# Patient Record
Sex: MALE | URBAN - NONMETROPOLITAN AREA
[De-identification: names, ages, dates, MRNs, and addresses within clinical notes are randomized per-mention and may not be internally consistent; named-entity substitution may affect disease eponyms.]

---

## 2024-03-29 ENCOUNTER — TELEPHONE (OUTPATIENT)
Age: 69
End: 2024-03-29

## 2024-03-29 NOTE — TELEPHONE ENCOUNTER
Message sent to [facility]    [10:29 AM] Maddy Howard ([facility])     New Consult   Elbert Thompson 11/29/1961   Denise @ [facility] [phone] called in consult. Dr Michel Goss is consulting for wound culture positive for ESBL Klebsiella. The patient is in room 581.

## 2024-10-25 NOTE — PROGRESS NOTES
Subjective    Mr. Victor is 69 y.o. male    Chief Complaint: Kidney stone    History of Present Illness  Patient presents as a new patient to [facility] urology he was referred due to history of kidney stones.  Recently passed a 6 x 4 mm left proximal ureteral stone it was seen on CT scan he did bring the disc with him today.  He was not having any pain he developed gross hematuria.  He is on blood thinners he is on aspirin and Xarelto.  CT showed no other kidney stones.  Patient had episode approximately 30 years ago.  Also has history of prostate cancer he had a prostatectomy 2005 at [facility].  Has had nondetectable PSAs.  Patient is doing fine now he brought his stone in to his PCP and it showed findings consistent with calcium stones.  Patient had KUB today I see nothing on the KUB there is a calcification described on the KUB in the left upper quadrant however on CT there is no vascular calcification or any kidney stone to explain this most likely is artifact.      Review of Systems   Constitutional: Negative.    Gastrointestinal: Negative.    Genitourinary: Negative.          Current Outpatient Medications:     ALPRAZolam (XANAX) 0.5 MG tablet, Take 1 tablet by mouth 2 (Two) Times a Day As Needed for Anxiety., Disp: , Rfl:     amLODIPine (NORVASC) 5 MG tablet, Take 1 tablet by mouth Daily., Disp: , Rfl:     Ascorbic Acid (VITAMIN C PO), Take  by mouth., Disp: , Rfl:     aspirin 81 MG EC tablet, Take 1 tablet by mouth Daily., Disp: , Rfl:     Calcium Citrate-Vitamin D3 (CITRACAL) 315-6.25 MG-MCG tablet tablet, Take  by mouth Daily., Disp: , Rfl:     Coenzyme Q10 10 MG capsule, Take 10 mg by mouth Daily., Disp: , Rfl:     Cyanocobalamin 1000 MCG capsule, Take  by mouth., Disp: , Rfl:     ezetimibe-simvastatin (VYTORIN) 10-20 MG per tablet, Take 1 tablet by mouth Daily., Disp: , Rfl:     fluticasone (FLONASE) 50 MCG/ACT nasal spray, 2 SPRAYS EACH NOSTRIL EACH MORNING, Disp: , Rfl:     levocetirizine (XYZAL) 5 MG  tablet, Take 1 tablet by mouth Daily., Disp: , Rfl:     levothyroxine (SYNTHROID, LEVOTHROID) 88 MCG tablet, Take 1 tablet by mouth Daily., Disp: , Rfl:     losartan-hydrochlorothiazide (HYZAAR) 100-25 MG per tablet, Take 1 tablet by mouth Daily., Disp: , Rfl:     Magnesium Gluconate 550 MG tablet, Take 30 mg by mouth., Disp: , Rfl:     metoprolol succinate XL (TOPROL-XL) 100 MG 24 hr tablet, Take 1 tablet by mouth Daily., Disp: , Rfl:     multivitamin with minerals (CENTRUM SILVER PO), Take 1 tablet by mouth Daily., Disp: , Rfl:     nitroglycerin (NITROSTAT) 0.4 MG SL tablet, Place 1 tablet under the tongue Every 5 (Five) Minutes As Needed for Chest Pain. Take no more than 3 doses in 15 minutes., Disp: , Rfl:     Omega-3 Fatty Acids (OMEGA-3 FISH OIL PO), Take  by mouth., Disp: , Rfl:     omeprazole (priLOSEC) 40 MG capsule, Take 1 capsule by mouth Daily., Disp: , Rfl:     Quercetin 500 MG capsule, Take  by mouth., Disp: , Rfl:     sotalol (BETAPACE) 120 MG tablet, Take 1 tablet by mouth 2 (Two) Times a Day., Disp: , Rfl:     valACYclovir (VALTREX) 1000 MG tablet, Take 1 tablet by mouth., Disp: , Rfl:     venlafaxine XR (EFFEXOR-XR) 75 MG 24 hr capsule, Take 1 capsule by mouth Daily., Disp: , Rfl:     Xarelto 20 MG tablet, Take 1 tablet by mouth Every Evening., Disp: , Rfl:     zinc sulfate (ZINCATE) 220 (50 Zn) MG capsule, Take 1 capsule by mouth Daily., Disp: , Rfl:     Past Medical History:   Diagnosis Date    A-fib     Hyperlipemia     Hypertension     Prostate cancer        Past Surgical History:   Procedure Laterality Date    CARDIAC ABLATION      CARDIAC SURGERY      PROSTATE SURGERY      Removal       Social History     Socioeconomic History    Marital status:    Tobacco Use    Smoking status: Never     Passive exposure: Past    Smokeless tobacco: Never   Vaping Use    Vaping status: Never Used   Substance and Sexual Activity    Alcohol use: Never    Drug use: Never    Sexual activity: Defer        History reviewed. No pertinent family history.    Objective    There were no vitals taken for this visit.    Physical Exam  Vitals reviewed.   Constitutional:       Appearance: Normal appearance.   HENT:      Head: Normocephalic and atraumatic.   Pulmonary:      Effort: Pulmonary effort is normal.   Skin:     Coloration: Skin is not pale.   Neurological:      Mental Status: He is alert.   Psychiatric:         Mood and Affect: Mood normal.         Behavior: Behavior normal.             Results for orders placed or performed in visit on 11/05/24   POC Urinalysis Dipstick, Multipro    Collection Time: 11/05/24 10:50 AM    Specimen: Urine   Result Value Ref Range    Color Yellow Yellow, Straw, Dark Yellow, Kimberly    Clarity, UA Clear Clear    Glucose, UA Negative Negative mg/dL    Bilirubin Negative Negative    Ketones, UA Negative Negative    Specific Gravity  1.025 1.005 - 1.030    Blood, UA Negative Negative    pH, Urine 6.0 5.0 - 8.0    Protein, POC Negative Negative mg/dL    Urobilinogen, UA 2.0 E.U./dL (A) Normal, 0.2 E.U./dL    Nitrite, UA Negative Negative    Leukocytes Negative Negative     Independent review of CT scan of the abdomen/pelvis The patient has undergone a CT scan of the abdomen and pelvis With contrast. The images are available for me to review as an independent interpretation for evaluation and management.  Assessment of the renal parenchyma with regards to thickness, scarring, symmetry in appearance and function, presence of masses both pre-and postcontrast, and calcifications are noted.  The collecting system with regard to dilatation, presence of calcifications, and masses were reviewed.  The course and caliber the ureters also noted.  The renal vessels and retroperitoneum is inspected for pathology.  The solid viscera and bowel pattern are briefly reviewed, but will also be inspected by the radiologist. The renal pelvis is inspected.  This study shows a 6 x 4 mm obstructing left proximal  ureteral stone.  No stone seen in either kidney.  No bladder stones.  Consistent with prostatectomy.     KUB independent review    A KUB is available for me to review today.  The image is inspected for a bowel gas pattern and the general bone structure of the spine and pelvis. The kidneys are then inspected closely.  Renal outline is noted if identifiable. The kidney, collecting system, and anticipated path of the ureter are examined for calcifications including those in the true pelvis.  This film reveals:    On the right there are no calcificaitons seen in the kidney or the expected course of the ureter.   There is a calcification left upper quadrant this is most likely artifact or something passing through his stool CT done 10/11/2024 revealed no kidney stones or vascular calcifications to explain this.  On the left there are no calcificaitons seen in the kidney or the expected course of the ureter.     Assessment and Plan    Diagnoses and all orders for this visit:    1. History of kidney stones (Primary)  -     POC Urinalysis Dipstick, Multipro  -     XR Abdomen KUB  -     [facility] 24-Hour Urine, Kidney Stone -; Future    2. History of prostate cancer      Calcification seen on KUB most likely artifact towards something in the stool passing through the colon there is no calcification phlebolith or kidney stones on the CT scan that was done 10/11/2024.    Patient had a 6 x 4 mm left proximal ureteral stone that he passed he took to his primary care physician who sent it for analysis revealed calcium oxalate stone.  After discussion patient will return after doing 24-hour urine for stone risk.  Urine 2 weeks to discuss findings.    Has a history of prostate cancer he had a prostatectomy 2005 at [facility] he has had nondetectable PSAs most recent PSA was nondetectable.  Can continue to have these done through his PCP.

## 2024-11-04 ENCOUNTER — TELEPHONE (OUTPATIENT)
Dept: UROLOGY | Facility: CLINIC | Age: 69
End: 2024-11-04
Payer: MEDICARE

## 2024-11-04 NOTE — TELEPHONE ENCOUNTER
Called Patient to remind them to get KUB and Disk prior to appointment.  Spoke with Patient.  If patient calls back it is ok for the HUB to tell the pt the message.

## 2024-11-05 ENCOUNTER — OFFICE VISIT (OUTPATIENT)
Dept: UROLOGY | Facility: CLINIC | Age: 69
End: 2024-11-05
Payer: MEDICARE

## 2024-11-05 ENCOUNTER — HOSPITAL ENCOUNTER (OUTPATIENT)
Dept: GENERAL RADIOLOGY | Facility: HOSPITAL | Age: 69
Discharge: HOME | End: 2024-11-05
Admitting: PHYSICIAN ASSISTANT
Payer: MEDICARE

## 2024-11-05 DIAGNOSIS — Z87.442 HISTORY OF KIDNEY STONES: Primary | ICD-10-CM

## 2024-11-05 DIAGNOSIS — Z85.46 HISTORY OF PROSTATE CANCER: ICD-10-CM

## 2024-11-05 LAB
BILIRUB BLD-MCNC: NEGATIVE MG/DL
CLARITY, POC: CLEAR
COLOR UR: YELLOW
GLUCOSE UR STRIP-MCNC: NEGATIVE MG/DL
KETONES UR QL: NEGATIVE
LEUKOCYTE EST, POC: NEGATIVE
NITRITE UR-MCNC: NEGATIVE MG/ML
PH UR: 6 [PH] (ref 5–8)
PROT UR STRIP-MCNC: NEGATIVE MG/DL
RBC # UR STRIP: NEGATIVE /UL
SP GR UR: 1.02 (ref 1–1.03)
UROBILINOGEN UR QL: ABNORMAL

## 2024-11-05 PROCEDURE — 74018 RADEX ABDOMEN 1 VIEW: CPT

## 2024-11-05 RX ORDER — ASPIRIN 81 MG/1
81 TABLET ORAL DAILY
COMMUNITY

## 2024-11-05 RX ORDER — FLUTICASONE PROPIONATE 50 MCG
SPRAY, SUSPENSION (ML) NASAL
COMMUNITY
Start: 2024-08-21

## 2024-11-05 RX ORDER — THIAMINE HCL 100 MG
TABLET ORAL DAILY
COMMUNITY

## 2024-11-05 RX ORDER — LOSARTAN POTASSIUM AND HYDROCHLOROTHIAZIDE 25; 100 MG/1; MG/1
1 TABLET ORAL DAILY
COMMUNITY
Start: 2024-10-09

## 2024-11-05 RX ORDER — SOTALOL HYDROCHLORIDE 120 MG/1
120 TABLET ORAL 2 TIMES DAILY
COMMUNITY

## 2024-11-05 RX ORDER — ZINC SULFATE 50(220)MG
220 CAPSULE ORAL DAILY
COMMUNITY

## 2024-11-05 RX ORDER — LEVOTHYROXINE SODIUM 88 UG/1
88 TABLET ORAL DAILY
COMMUNITY

## 2024-11-05 RX ORDER — RIVAROXABAN 20 MG/1
20 TABLET, FILM COATED ORAL EVERY EVENING
COMMUNITY

## 2024-11-05 RX ORDER — NITROGLYCERIN 0.4 MG/1
0.4 TABLET SUBLINGUAL
COMMUNITY

## 2024-11-05 RX ORDER — OMEPRAZOLE 40 MG/1
40 CAPSULE, DELAYED RELEASE ORAL DAILY
COMMUNITY
Start: 2024-10-22

## 2024-11-05 RX ORDER — METOPROLOL SUCCINATE 100 MG/1
100 TABLET, EXTENDED RELEASE ORAL DAILY
COMMUNITY

## 2024-11-05 RX ORDER — VENLAFAXINE HYDROCHLORIDE 75 MG/1
75 CAPSULE, EXTENDED RELEASE ORAL DAILY
COMMUNITY
Start: 2024-07-19

## 2024-11-05 RX ORDER — VALACYCLOVIR HYDROCHLORIDE 1 G/1
1000 TABLET, FILM COATED ORAL
COMMUNITY

## 2024-11-05 RX ORDER — ALPRAZOLAM 0.5 MG
0.5 TABLET ORAL 2 TIMES DAILY PRN
COMMUNITY

## 2024-11-05 RX ORDER — MILK THISTLE 150 MG
CAPSULE ORAL
COMMUNITY

## 2024-11-05 RX ORDER — MAGNESIUM GLUCONATE 30 MG(550)
30 TABLET ORAL
COMMUNITY

## 2024-11-05 RX ORDER — EZETIMIBE AND SIMVASTATIN 10; 20 MG/1; MG/1
1 TABLET ORAL DAILY
COMMUNITY
Start: 2024-10-01

## 2024-11-05 RX ORDER — AMLODIPINE BESYLATE 5 MG/1
5 TABLET ORAL DAILY
COMMUNITY

## 2024-11-05 RX ORDER — LEVOCETIRIZINE DIHYDROCHLORIDE 5 MG/1
5 TABLET, FILM COATED ORAL DAILY
COMMUNITY
Start: 2024-09-20

## 2024-11-07 ENCOUNTER — LAB (OUTPATIENT)
Dept: LAB | Facility: HOSPITAL | Age: 69
End: 2024-11-07
Payer: MEDICARE

## 2024-11-07 DIAGNOSIS — Z87.442 HISTORY OF KIDNEY STONES: Primary | ICD-10-CM

## 2024-11-07 PROCEDURE — 84133 ASSAY OF URINE POTASSIUM: CPT

## 2024-11-07 PROCEDURE — 82507 ASSAY OF CITRATE: CPT

## 2024-11-07 PROCEDURE — 84300 ASSAY OF URINE SODIUM: CPT

## 2024-11-07 PROCEDURE — 82340 ASSAY OF CALCIUM IN URINE: CPT

## 2024-11-07 PROCEDURE — 82140 ASSAY OF AMMONIA: CPT

## 2024-11-07 PROCEDURE — 83986 ASSAY PH BODY FLUID NOS: CPT

## 2024-11-07 PROCEDURE — 84540 ASSAY OF URINE/UREA-N: CPT

## 2024-11-07 PROCEDURE — 84105 ASSAY OF URINE PHOSPHORUS: CPT

## 2024-11-07 PROCEDURE — 83945 ASSAY OF OXALATE: CPT

## 2024-11-07 PROCEDURE — 82436 ASSAY OF URINE CHLORIDE: CPT

## 2024-11-07 PROCEDURE — 82570 ASSAY OF URINE CREATININE: CPT

## 2024-11-07 PROCEDURE — 83735 ASSAY OF MAGNESIUM: CPT

## 2024-11-07 PROCEDURE — 82615 TEST FOR URINE CYSTINES: CPT

## 2024-11-07 PROCEDURE — 84392 ASSAY OF URINE SULFATE: CPT

## 2024-11-07 PROCEDURE — 84560 ASSAY OF URINE/URIC ACID: CPT

## 2024-11-13 LAB
AMMONIA 24H UR-SRATE: 47 MMOL/24 HR (ref 15–60)
CA H2 PHOS DIHYD 24H SATFR UR: 0.19 (ref 0.5–2)
CALCIUM 24H UR-MRATE: 29 MG/24 HR
CALCIUM/CREAT 24H UR: 18 MG/G CREAT (ref 34–196)
CALCIUM/KG BODY WEIGHT: 0.2 MG/24 HR/KG
CAOX INDEX 24H UR-RTO: 4.77 (ref 6–10)
CHLORIDE 24H UR-SRATE: 93 MMOL/24 HR (ref 70–250)
CITRATE 24H UR-MRATE: 158 MG/24 HR
CREAT 24H UR-MRATE: 1661 MG/24 HR
CREAT/BW 24H UR-RELMRAT: 13.5 MG/24 HR/KG (ref 11.9–24.4)
CYSTINE 24H UR QL: ABNORMAL
LABORATORY COMMENT REPORT: ABNORMAL
MAGNESIUM 24H UR-MRATE: 75 MG/24 HR (ref 30–120)
OXALATE 24H UR-MRATE: 110 MG/24 HR (ref 20–40)
PH 24H UR: 5.82 [PH] (ref 5.8–6.2)
PHOSPHATE 24H UR-MRATE: 1352 MG/24 HR (ref 600–1200)
POTASSIUM 24H UR-SRATE: 73 MMOL/24 HR (ref 20–100)
PROTEIN CATABOLIC RATE 24H UR-MRATE: 0.9 G/KG/24 HR (ref 0.8–1.4)
SODIUM 24H UR-SRATE: 110 MMOL/24 HR (ref 50–150)
SPECIMEN VOL 24H UR: 1370 ML/24 HR (ref 500–4000)
SULFATE 24H UR-SRATE: 59 MEQ/24 HR (ref 20–80)
URATE 24H SATFR UR: 1.78
URATE 24H UR-MRATE: 886 MG/24 HR
UUN 24H UR-MRATE: 13.17 G/24 HR (ref 6–14)

## 2024-11-19 NOTE — PROGRESS NOTES
Subjective    Mr. Victor is 69 y.o. male    Chief Complaint: To review 24-hour urine metabolic evaluation result    History of Present Illness    69-year-old male established patient presents to review 24-hour urine metabolic evaluation results.  History of kidney stones.  Recently with spontaneous passage of 6 mm left proximal ureteral stone found during workup for gross hematuria.  Prior history stones greater than 30 years ago.  History of prostate cancer underwent prostatectomy 2005 at [facility].  PSAs have remained undetectable.  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    The following portions of the patient's history were reviewed and updated as appropriate: allergies, current medications, past family history, past medical history, past social history, past surgical history and problem list.    Review of Systems   Constitutional:  Negative for chills, fatigue and fever.   Gastrointestinal:  Negative for nausea and vomiting.   Genitourinary:  Negative for difficulty urinating, flank pain, frequency and urgency.         Current Outpatient Medications:     ALPRAZolam (XANAX) 0.5 MG tablet, Take 1 tablet by mouth 2 (Two) Times a Day As Needed for Anxiety., Disp: , Rfl:     amLODIPine (NORVASC) 5 MG tablet, Take 1 tablet by mouth Daily., Disp: , Rfl:     Ascorbic Acid (VITAMIN C PO), Take  by mouth., Disp: , Rfl:     aspirin 81 MG EC tablet, Take 1 tablet by mouth Daily., Disp: , Rfl:     Calcium Citrate-Vitamin D3 (CITRACAL) 315-6.25 MG-MCG tablet tablet, Take  by mouth Daily., Disp: , Rfl:     Coenzyme Q10 10 MG capsule, Take 10 mg by mouth Daily., Disp: , Rfl:     Cyanocobalamin 1000 MCG capsule, Take  by mouth., Disp: , Rfl:     ezetimibe-simvastatin (VYTORIN) 10-20 MG per tablet, Take 1 tablet by mouth Daily., Disp: , Rfl:     fluticasone (FLONASE) 50 MCG/ACT nasal spray, 2 SPRAYS EACH NOSTRIL EACH MORNING, Disp: , Rfl:     levocetirizine (XYZAL) 5 MG tablet, Take 1 tablet by  mouth Daily., Disp: , Rfl:     levothyroxine (SYNTHROID, LEVOTHROID) 88 MCG tablet, Take 1 tablet by mouth Daily., Disp: , Rfl:     losartan-hydrochlorothiazide (HYZAAR) 100-25 MG per tablet, Take 1 tablet by mouth Daily., Disp: , Rfl:     Magnesium Gluconate 550 MG tablet, Take 30 mg by mouth., Disp: , Rfl:     metoprolol succinate XL (TOPROL-XL) 100 MG 24 hr tablet, Take 1 tablet by mouth Daily., Disp: , Rfl:     multivitamin with minerals (CENTRUM SILVER PO), Take 1 tablet by mouth Daily., Disp: , Rfl:     nitroglycerin (NITROSTAT) 0.4 MG SL tablet, Place 1 tablet under the tongue Every 5 (Five) Minutes As Needed for Chest Pain. Take no more than 3 doses in 15 minutes., Disp: , Rfl:     Omega-3 Fatty Acids (OMEGA-3 FISH OIL PO), Take  by mouth., Disp: , Rfl:     omeprazole (priLOSEC) 40 MG capsule, Take 1 capsule by mouth Daily., Disp: , Rfl:     Quercetin 500 MG capsule, Take  by mouth., Disp: , Rfl:     sotalol (BETAPACE) 120 MG tablet, Take 1 tablet by mouth 2 (Two) Times a Day., Disp: , Rfl:     valACYclovir (VALTREX) 1000 MG tablet, Take 1 tablet by mouth., Disp: , Rfl:     venlafaxine XR (EFFEXOR-XR) 75 MG 24 hr capsule, Take 1 capsule by mouth Daily., Disp: , Rfl:     Xarelto 20 MG tablet, Take 1 tablet by mouth Every Evening., Disp: , Rfl:     zinc sulfate (ZINCATE) 220 (50 Zn) MG capsule, Take 1 capsule by mouth Daily., Disp: , Rfl:     potassium citrate (UROCIT-K) 10 MEQ (1080 MG) CR tablet, Take 2 tablets by mouth 3 (Three) Times a Day With Meals., Disp: 180 tablet, Rfl: 11    Past Medical History:   Diagnosis Date    A-fib     Hyperlipemia     Hypertension     Prostate cancer        Past Surgical History:   Procedure Laterality Date    CARDIAC ABLATION      CARDIAC SURGERY      PROSTATE SURGERY      Removal       Social History     Socioeconomic History    Marital status:    Tobacco Use    Smoking status: Never     Passive exposure: Past    Smokeless tobacco: Never   Vaping Use    Vaping  "status: Never Used   Substance and Sexual Activity    Alcohol use: Never    Drug use: Never    Sexual activity: Defer       History reviewed. No pertinent family history.    Objective    Temp 97.5 °F (36.4 °C)   Ht 170.2 cm (67.01\")   Wt 119 kg (263 lb)   BMI 41.18 kg/m²     Physical Exam  Constitutional:       Appearance: Normal appearance.   Abdominal:      Tenderness: There is no right CVA tenderness or left CVA tenderness.   Skin:     General: Skin is warm and dry.   Neurological:      Mental Status: He is alert and oriented to person, place, and time.   Psychiatric:         Mood and Affect: Mood normal.         Behavior: Behavior normal.             Results for orders placed or performed in visit on 11/26/24   POC Urinalysis Dipstick, Multipro    Collection Time: 11/26/24  2:26 PM    Specimen: Urine   Result Value Ref Range    Color Yellow Yellow, Straw, Dark Yellow, Kimberly    Clarity, UA Clear Clear    Glucose, UA Negative Negative mg/dL    Bilirubin Negative Negative    Ketones, UA Negative Negative    Specific Gravity  1.020 1.005 - 1.030    Blood, UA Negative Negative    pH, Urine 7.0 5.0 - 8.0    Protein, POC Trace (A) Negative mg/dL    Urobilinogen, UA 0.2 E.U./dL Normal, 0.2 E.U./dL    Nitrite, UA Negative Negative    Leukocytes Negative Negative   [facility] 24Hr Urine Panel - (11/07/2024 11:30)   Assessment and Plan    Diagnoses and all orders for this visit:    1. History of kidney stones (Primary)  -     POC Urinalysis Dipstick, Multipro  -     potassium citrate (UROCIT-K) 10 MEQ (1080 MG) CR tablet; Take 2 tablets by mouth 3 (Three) Times a Day With Meals.  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    Based on these findings recommend further evaluation with serum uric acid and PTH levels    Have also strongly encouraged increasing fluid intake to produce an output of at least 2 L daily meaning to consume at least 3 L.  Have encouraged lemonade therapy as well as following a calcium oxalate diet-have provided patient with a dietary sheet    Patient's citrate level quite low at 158 therefore recommend starting on potassiums citrate 20 mEq 3 times daily    Will hold off on starting on allopurinol at this time until serum uric acid level completed    Will have follow-up in 3 to 4 months with repeat 24-hour urine

## 2024-11-26 ENCOUNTER — OFFICE VISIT (OUTPATIENT)
Dept: UROLOGY | Facility: CLINIC | Age: 69
End: 2024-11-26
Payer: MEDICARE

## 2024-11-26 VITALS — BODY MASS INDEX: 41.28 KG/M2 | HEIGHT: 67 IN | WEIGHT: 263 LBS | TEMPERATURE: 97.5 F

## 2024-11-26 DIAGNOSIS — Z87.442 HISTORY OF KIDNEY STONES: Primary | ICD-10-CM

## 2024-11-26 LAB
BILIRUB BLD-MCNC: NEGATIVE MG/DL
CLARITY, POC: CLEAR
COLOR UR: YELLOW
GLUCOSE UR STRIP-MCNC: NEGATIVE MG/DL
KETONES UR QL: NEGATIVE
LEUKOCYTE EST, POC: NEGATIVE
NITRITE UR-MCNC: NEGATIVE MG/ML
PH UR: 7 [PH] (ref 5–8)
PROT UR STRIP-MCNC: ABNORMAL MG/DL
RBC # UR STRIP: NEGATIVE /UL
SP GR UR: 1.02 (ref 1–1.03)
UROBILINOGEN UR QL: ABNORMAL

## 2024-11-26 RX ORDER — POTASSIUM CITRATE 10 MEQ/1
20 TABLET, EXTENDED RELEASE ORAL
Qty: 180 TABLET | Refills: 11 | Status: SHIPPED | OUTPATIENT
Start: 2024-11-26

## 2025-03-12 ENCOUNTER — LAB (OUTPATIENT)
Dept: LAB | Facility: HOSPITAL | Age: 70
End: 2025-03-12
Payer: MEDICARE

## 2025-03-12 DIAGNOSIS — Z87.442 HISTORY OF KIDNEY STONES: ICD-10-CM

## 2025-03-12 LAB
PTH-INTACT SERPL-MCNC: 91.8 PG/ML (ref 15–65)
URATE SERPL-MCNC: 6.2 MG/DL (ref 3.4–7)

## 2025-03-12 PROCEDURE — 84133 ASSAY OF URINE POTASSIUM: CPT

## 2025-03-12 PROCEDURE — 84540 ASSAY OF URINE/UREA-N: CPT

## 2025-03-12 PROCEDURE — 84105 ASSAY OF URINE PHOSPHORUS: CPT

## 2025-03-12 PROCEDURE — 82140 ASSAY OF AMMONIA: CPT

## 2025-03-12 PROCEDURE — 82436 ASSAY OF URINE CHLORIDE: CPT

## 2025-03-12 PROCEDURE — 82615 TEST FOR URINE CYSTINES: CPT

## 2025-03-12 PROCEDURE — 83986 ASSAY PH BODY FLUID NOS: CPT

## 2025-03-12 PROCEDURE — 84392 ASSAY OF URINE SULFATE: CPT

## 2025-03-12 PROCEDURE — 82340 ASSAY OF CALCIUM IN URINE: CPT

## 2025-03-12 PROCEDURE — 82507 ASSAY OF CITRATE: CPT

## 2025-03-12 PROCEDURE — 83945 ASSAY OF OXALATE: CPT

## 2025-03-12 PROCEDURE — 84300 ASSAY OF URINE SODIUM: CPT

## 2025-03-12 PROCEDURE — 36415 COLL VENOUS BLD VENIPUNCTURE: CPT

## 2025-03-12 PROCEDURE — 84560 ASSAY OF URINE/URIC ACID: CPT

## 2025-03-12 PROCEDURE — 84550 ASSAY OF BLOOD/URIC ACID: CPT

## 2025-03-12 PROCEDURE — 83735 ASSAY OF MAGNESIUM: CPT

## 2025-03-12 PROCEDURE — 83970 ASSAY OF PARATHORMONE: CPT

## 2025-03-12 PROCEDURE — 82570 ASSAY OF URINE CREATININE: CPT

## 2025-03-13 ENCOUNTER — RESULTS FOLLOW-UP (OUTPATIENT)
Dept: UROLOGY | Facility: CLINIC | Age: 70
End: 2025-03-13
Payer: MEDICARE

## 2025-03-14 NOTE — TELEPHONE ENCOUNTER
Left patient a voicemail to let him know   PTH significantly elevated. Based on this finding recommend referral to endocrinology. Would also get back in with patient PCP. Unfortunately I believe the nearest endocrinologist is in [place]. If patient PCP recommends elsewhere please let me know and I would be glad to place referral       HUB can read.. or transfer him to clinical

## 2025-03-17 ENCOUNTER — TELEPHONE (OUTPATIENT)
Dept: UROLOGY | Facility: CLINIC | Age: 70
End: 2025-03-17
Payer: MEDICARE

## 2025-03-17 NOTE — PROGRESS NOTES
Subjective    Mr. Victor is 69 y.o. male    Chief Complaint: Follow-up to review 24-hour urine    History of Present Illness    69-year-old male established patient in to review 24-hour urine results as well as PTH and uric acid levels.  Due to patient history of kidney stones.  Patient with most recent passage spontaneously of a 6 mm left proximal ureteral stone found during workup for gross hematuria.  Prior to that greater than 30 years ago last stone episode.    Patient was noted to have elevated PTH level on recent blood work 91.8 therefore patient was seen by his PCP where repeat PTH level was completed and was normal at 59.6.  Therefore no appointment with endocrinology was made.  Calcium level within normal limits as well as serum uric acid.    Previous 24-hour urine revealing hypocitraturia along with low urine volume, elevated uric acid and hyperoxalauria.  Patient was started on potassiums citrate.  Patient reports had been taking at least 2 days weekly until the elevation in PTH occurred.  Has since stopped use of potassium citrate as patient did not know if this was the cause.  Has increased water intake.    History of prostate cancer underwent prostatectomy 2005 at [facility]. PSAs have remained undetectable.  Is reporting worsening issues with penile irritation for which patient states he currently has an area on the side of his penis along the shaft/skin area where patient PCP has been treating with Lotrimin cream and has been ineffective.  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  As well as erectile dysfunction.    The following portions of the patient's history were reviewed and updated as appropriate: allergies, current medications, past family history, past medical history, past social history, past surgical history and problem list.    Review of Systems   Constitutional:  Negative for chills, fatigue  and fever.   Gastrointestinal:  Negative for nausea and vomiting.   Genitourinary:  Positive for penile pain. Negative for flank pain.         Current Outpatient Medications:     amLODIPine (NORVASC) 5 MG tablet, Take 1 tablet by mouth Daily., Disp: , Rfl:     amoxicillin-clavulanate (AUGMENTIN) 875-125 MG per tablet, Take 1 tablet by mouth 2 (Two) Times a Day., Disp: , Rfl:     Ascorbic Acid (VITAMIN C PO), Take  by mouth., Disp: , Rfl:     aspirin 81 MG EC tablet, Take 1 tablet by mouth Daily., Disp: , Rfl:     Calcium Citrate-Vitamin D3 (CITRACAL) 315-6.25 MG-MCG tablet tablet, Take  by mouth Daily., Disp: , Rfl:     clotrimazole (LOTRIMIN) 1 % cream, Apply  topically to the appropriate area as directed 2 (Two) Times a Day., Disp: , Rfl:     Coenzyme Q10 10 MG capsule, Take 10 mg by mouth Daily., Disp: , Rfl:     Cyanocobalamin 1000 MCG capsule, Take  by mouth., Disp: , Rfl:     ezetimibe-simvastatin (VYTORIN) 10-20 MG per tablet, Take 1 tablet by mouth Daily., Disp: , Rfl:     fluticasone (FLONASE) 50 MCG/ACT nasal spray, 2 SPRAYS EACH NOSTRIL EACH MORNING, Disp: , Rfl:     levocetirizine (XYZAL) 5 MG tablet, Take 1 tablet by mouth Daily., Disp: , Rfl:     levothyroxine (SYNTHROID, LEVOTHROID) 88 MCG tablet, Take 1 tablet by mouth Daily., Disp: , Rfl:     losartan-hydrochlorothiazide (HYZAAR) 100-25 MG per tablet, Take 1 tablet by mouth Daily., Disp: , Rfl:     Magnesium Gluconate 550 MG tablet, Take 30 mg by mouth., Disp: , Rfl:     multivitamin with minerals (CENTRUM SILVER PO), Take 1 tablet by mouth Daily., Disp: , Rfl:     nitroglycerin (NITROSTAT) 0.4 MG SL tablet, Place 1 tablet under the tongue Every 5 (Five) Minutes As Needed for Chest Pain. Take no more than 3 doses in 15 minutes., Disp: , Rfl:     Omega-3 Fatty Acids (OMEGA-3 FISH OIL PO), Take  by mouth., Disp: , Rfl:     omeprazole (priLOSEC) 40 MG capsule, Take 1 capsule by mouth Daily., Disp: , Rfl:     Quercetin 500 MG capsule, Take  by mouth.,  Disp: , Rfl:     sodium-potassium-magnesium sulfates (SUPREP) 17.5-3.13-1.6 GM/177ML solution oral solution, 1 kit, Disp: , Rfl:     sotalol (BETAPACE) 120 MG tablet, Take 1 tablet by mouth 2 (Two) Times a Day., Disp: , Rfl:     valACYclovir (VALTREX) 1000 MG tablet, Take 1 tablet by mouth., Disp: , Rfl:     venlafaxine XR (EFFEXOR-XR) 75 MG 24 hr capsule, Take 1 capsule by mouth Daily., Disp: , Rfl:     Xarelto 20 MG tablet, Take 1 tablet by mouth Every Evening., Disp: , Rfl:     zinc sulfate (ZINCATE) 220 (50 Zn) MG capsule, Take 1 capsule by mouth Daily., Disp: , Rfl:     clotrimazole-betamethasone (LOTRISONE) 1-0.05 % cream, Apply 1 Application topically to the appropriate area as directed 2 (Two) Times a Day., Disp: 45 g, Rfl: 3    Past Medical History:   Diagnosis Date    A-fib     Hyperlipemia     Hypertension     Prostate cancer        Past Surgical History:   Procedure Laterality Date    CARDIAC ABLATION      CARDIAC SURGERY      PROSTATE SURGERY      Removal       Social History     Socioeconomic History    Marital status:    Tobacco Use    Smoking status: Never     Passive exposure: Past    Smokeless tobacco: Never   Vaping Use    Vaping status: Never Used   Substance and Sexual Activity    Alcohol use: Never    Drug use: Never    Sexual activity: Defer       History reviewed. No pertinent family history.    Objective    There were no vitals taken for this visit.    Physical Exam  Constitutional:       Appearance: Normal appearance.   Abdominal:      Tenderness: There is no right CVA tenderness or left CVA tenderness.   Skin:     Comments: Right lateral side penile shaft just below the glans penis area of excoration noted-likely d/t moisture/yeast   Neurological:      Mental Status: He is alert and oriented to person, place, and time.   Psychiatric:         Mood and Affect: Mood normal.         Behavior: Behavior normal.             Results for orders placed or performed in visit on 03/27/25   POC  Urinalysis Dipstick, Multipro    Collection Time: 03/27/25  1:19 PM    Specimen: Urine   Result Value Ref Range    Color Yellow Yellow, Straw, Dark Yellow, Kimberly    Clarity, UA Clear Clear    Glucose, UA Negative Negative mg/dL    Bilirubin Negative Negative    Ketones, UA Negative Negative    Specific Gravity  1.010 1.005 - 1.030    Blood, UA Negative Negative    pH, Urine 7.0 5.0 - 8.0    Protein, POC Negative Negative mg/dL    Urobilinogen, UA 0.2 E.U./dL Normal, 0.2 E.U./dL    Nitrite, UA Negative Negative    Leukocytes Negative Negative   IPSS Questionnaire (AUA-7):  Incomplete emptying  Over the past month, how often have you had a sensation of not emptying your bladder completely after you finished urinating?: Not at all (03/27/25 1320)  Frequency  Over the past month, how often have you had to urinate again less than two hours after you finishied urinating ?: Not at all (03/27/25 1320)  Intermittency  Over the past month, how often have you found you stopped and started again several time when you urinated ?: Not at all (03/27/25 1320)  Urgency  Over the last month, how often have you found it difficult  have you found it difficult to postpone urination ?: Not at all (03/27/25 1320)  Weak Stream  Over the past month, how often have you had a weak urinary stream ?: Not at all (03/27/25 1320)  Straining  Over the past month, how often have you had to push or strain to begin urination ?: Not at all (03/27/25 1320)  Nocturia  Over the past month, how many times did you most typically get up to urinate from the time you went to bed until the time you got up in the morning ?: 1 time (03/27/25 1320)  Quality of life due to urinary symptoms  If you were to spend the rest of your life with your urinary condition the way it is now, how would feel about that?: Delighted (03/27/25 1320)    Scores  Total IPSS Score: 1 (03/27/25 1320)  Total Score = Symptomatic Level: Mildly symptomatic: 0-7 (03/27/25 1320)     PTH,  INTACT (03/18/2025 14:47 EDT) PTH, Intact (03/12/2025 13:57) Uric Acid (03/12/2025 13:57) [facility] 24Hr Urine Panel - (03/12/2025 13:57)   Assessment and Plan    Diagnoses and all orders for this visit:    1. History of kidney stones (Primary)  -     POC Urinalysis Dipstick, Multipro  -     XR Abdomen KUB; Future    2. Balanitis  -     clotrimazole-betamethasone (LOTRISONE) 1-0.05 % cream; Apply 1 Application topically to the appropriate area as directed 2 (Two) Times a Day.  Dispense: 45 g; Refill: 3      Repeat 24-hour urine revealing low citrate level and low pH levels have resolved is now within normal limits since the addition of potassium citrate.  Patient appears to have had a fluke reading on PTH where patient was elevated however repeat revealed back to normal limits therefore has held off on endocrinology consultation..  Patient's sodium remains elevated along with his uric acid within the urine only.  We discussed possibly starting allopurinol however patient would like to hold off at this time.  I have encouraged patient starting back on the potassium citrate as the medication worked well based on the most recent 24-hour urine.    Patient with balanitis for which we will start on Lotrisone cream 2 times daily x 2 to 3 weeks have encouraged patient to discontinue the Lotrimin.    Have also provided patient information regarding penile implant as patient has had erectile dysfunction since prostatectomy many years ago and is now battling increased balanitis as well as dribbling and difficulty voiding without sitting down due to the buried penis.-Patient to contact office if interested in consultation with Dr. Magaña    For now we will have follow-up in 1 year KUB prior

## 2025-03-17 NOTE — TELEPHONE ENCOUNTER
Patient called back I gave him his results. Patient did want to consult with his PCP before. I faxed his results over to Cinthya Javier. Zhanna did not think the Potassium Citrate would have caused him to have elevated PTH levels. But said if patient wanted to stop it and we could recheck his PTH levels.

## 2025-03-19 LAB
AMMONIA 24H UR-SRATE: 20 MMOL/24 HR (ref 15–60)
CA H2 PHOS DIHYD 24H SATFR UR: 0.42 (ref 0.5–2)
CALCIUM 24H UR-MRATE: 49 MG/24 HR
CALCIUM/CREAT 24H UR: 37 MG/G CREAT (ref 34–196)
CALCIUM/KG BODY WEIGHT: 0.4 MG/24 HR/KG
CAOX INDEX 24H UR-RTO: 1.24 (ref 6–10)
CHLORIDE 24H UR-SRATE: 155 MMOL/24 HR (ref 70–250)
CITRATE 24H UR-MRATE: 520 MG/24 HR
CREAT 24H UR-MRATE: 1312 MG/24 HR
CREAT/BW 24H UR-RELMRAT: 10.7 MG/24 HR/KG (ref 11.9–24.4)
CYSTINE 24H UR QL: ABNORMAL
LABORATORY COMMENT REPORT: ABNORMAL
MAGNESIUM 24H UR-MRATE: 116 MG/24 HR (ref 30–120)
OXALATE 24H UR-MRATE: 39 MG/24 HR (ref 20–40)
PH 24H UR: 7.16 [PH] (ref 5.8–6.2)
PHOSPHATE 24H UR-MRATE: 802 MG/24 HR (ref 600–1200)
POTASSIUM 24H UR-SRATE: 94 MMOL/24 HR (ref 20–100)
PROTEIN CATABOLIC RATE 24H UR-MRATE: 0.8 G/KG/24 HR (ref 0.8–1.4)
SODIUM 24H UR-SRATE: 178 MMOL/24 HR (ref 50–150)
SPECIMEN VOL 24H UR: 2550 ML/24 HR (ref 500–4000)
SULFATE 24H UR-SRATE: 35 MEQ/24 HR (ref 20–80)
URATE 24H SATFR UR: 0.05
URATE 24H UR-MRATE: 818 MG/24 HR
UUN 24H UR-MRATE: 11.26 G/24 HR (ref 6–14)

## 2025-03-27 ENCOUNTER — OFFICE VISIT (OUTPATIENT)
Dept: UROLOGY | Facility: CLINIC | Age: 70
End: 2025-03-27
Payer: MEDICARE

## 2025-03-27 DIAGNOSIS — N48.1 BALANITIS: ICD-10-CM

## 2025-03-27 DIAGNOSIS — Z87.442 HISTORY OF KIDNEY STONES: Primary | ICD-10-CM

## 2025-03-27 LAB
BILIRUB BLD-MCNC: NEGATIVE MG/DL
CLARITY, POC: CLEAR
COLOR UR: YELLOW
GLUCOSE UR STRIP-MCNC: NEGATIVE MG/DL
KETONES UR QL: NEGATIVE
LEUKOCYTE EST, POC: NEGATIVE
NITRITE UR-MCNC: NEGATIVE MG/ML
PH UR: 7 [PH] (ref 5–8)
PROT UR STRIP-MCNC: NEGATIVE MG/DL
RBC # UR STRIP: NEGATIVE /UL
SP GR UR: 1.01 (ref 1–1.03)
UROBILINOGEN UR QL: NORMAL

## 2025-03-27 RX ORDER — SODIUM, POTASSIUM,MAG SULFATES 17.5-3.13G
SOLUTION, RECONSTITUTED, ORAL ORAL
COMMUNITY
Start: 2024-12-02

## 2025-03-27 RX ORDER — CLOTRIMAZOLE AND BETAMETHASONE DIPROPIONATE 10; .64 MG/G; MG/G
1 CREAM TOPICAL 2 TIMES DAILY
Qty: 45 G | Refills: 3 | Status: SHIPPED | OUTPATIENT
Start: 2025-03-27

## 2025-03-27 RX ORDER — CLOTRIMAZOLE 1 %
CREAM (GRAM) TOPICAL 2 TIMES DAILY
COMMUNITY
Start: 2025-03-18

## 2025-04-17 ENCOUNTER — OFFICE VISIT (OUTPATIENT)
Dept: UROLOGY | Facility: CLINIC | Age: 70
End: 2025-04-17
Payer: MEDICARE

## 2025-04-17 VITALS — BODY MASS INDEX: 41.28 KG/M2 | HEIGHT: 67 IN | WEIGHT: 263 LBS | TEMPERATURE: 97.8 F

## 2025-04-17 DIAGNOSIS — Z87.442 HISTORY OF KIDNEY STONES: Primary | ICD-10-CM

## 2025-04-17 DIAGNOSIS — N48.1 BALANITIS: ICD-10-CM

## 2025-04-17 LAB
BILIRUB BLD-MCNC: NEGATIVE MG/DL
CLARITY, POC: CLEAR
COLOR UR: YELLOW
GLUCOSE UR STRIP-MCNC: NEGATIVE MG/DL
KETONES UR QL: NEGATIVE
LEUKOCYTE EST, POC: NEGATIVE
NITRITE UR-MCNC: NEGATIVE MG/ML
PH UR: 6 [PH] (ref 5–8)
PROT UR STRIP-MCNC: ABNORMAL MG/DL
RBC # UR STRIP: NEGATIVE /UL
SP GR UR: 1.03 (ref 1–1.03)
UROBILINOGEN UR QL: ABNORMAL

## 2025-04-17 NOTE — PROGRESS NOTES
"Subjective    Mr. Victor is 69 y.o. male    Chief Complaint: penile irritation recheck    History of Present Illness    69-year-old male established patient in for penile recheck after patient with complaint of penile irritation along the shaft during follow-up for history of kidney stones.  Patient had been previously treated with Lotrimin cream through his PCP.  Was later prescribed Lotrisone cream through our office.  Patient reports has continued to see a red \"splotch\" however no open area noted.  Wanted to make sure that the cream was in fact working as patient states he is fixing to be traveling for 3 weeks and will be getting in a pool.      The following portions of the patient's history were reviewed and updated as appropriate: allergies, current medications, past family history, past medical history, past social history, past surgical history and problem list.    Review of Systems   Constitutional:  Negative for chills, fatigue and fever.   Gastrointestinal:  Negative for nausea and vomiting.         Current Outpatient Medications:     amLODIPine (NORVASC) 5 MG tablet, Take 1 tablet by mouth Daily., Disp: , Rfl:     Ascorbic Acid (VITAMIN C PO), Take  by mouth., Disp: , Rfl:     aspirin 81 MG EC tablet, Take 1 tablet by mouth Daily., Disp: , Rfl:     Calcium Citrate-Vitamin D3 (CITRACAL) 315-6.25 MG-MCG tablet tablet, Take  by mouth Daily., Disp: , Rfl:     clotrimazole (LOTRIMIN) 1 % cream, Apply  topically to the appropriate area as directed 2 (Two) Times a Day., Disp: , Rfl:     clotrimazole-betamethasone (LOTRISONE) 1-0.05 % cream, Apply 1 Application topically to the appropriate area as directed 2 (Two) Times a Day., Disp: 45 g, Rfl: 3    Coenzyme Q10 10 MG capsule, Take 10 mg by mouth Daily., Disp: , Rfl:     Cyanocobalamin 1000 MCG capsule, Take  by mouth., Disp: , Rfl:     ezetimibe-simvastatin (VYTORIN) 10-20 MG per tablet, Take 1 tablet by mouth Daily., Disp: , Rfl:     fluticasone (FLONASE) 50 " MCG/ACT nasal spray, 2 SPRAYS EACH NOSTRIL EACH MORNING, Disp: , Rfl:     levocetirizine (XYZAL) 5 MG tablet, Take 1 tablet by mouth Daily., Disp: , Rfl:     levothyroxine (SYNTHROID, LEVOTHROID) 88 MCG tablet, Take 1 tablet by mouth Daily., Disp: , Rfl:     losartan-hydrochlorothiazide (HYZAAR) 100-25 MG per tablet, Take 1 tablet by mouth Daily., Disp: , Rfl:     Magnesium Gluconate 550 MG tablet, Take 30 mg by mouth., Disp: , Rfl:     multivitamin with minerals (CENTRUM SILVER PO), Take 1 tablet by mouth Daily., Disp: , Rfl:     nitroglycerin (NITROSTAT) 0.4 MG SL tablet, Place 1 tablet under the tongue Every 5 (Five) Minutes As Needed for Chest Pain. Take no more than 3 doses in 15 minutes., Disp: , Rfl:     Omega-3 Fatty Acids (OMEGA-3 FISH OIL PO), Take  by mouth., Disp: , Rfl:     omeprazole (priLOSEC) 40 MG capsule, Take 1 capsule by mouth Daily., Disp: , Rfl:     Quercetin 500 MG capsule, Take  by mouth., Disp: , Rfl:     sodium-potassium-magnesium sulfates (SUPREP) 17.5-3.13-1.6 GM/177ML solution oral solution, 1 kit, Disp: , Rfl:     sotalol (BETAPACE) 120 MG tablet, Take 1 tablet by mouth 2 (Two) Times a Day., Disp: , Rfl:     valACYclovir (VALTREX) 1000 MG tablet, Take 1 tablet by mouth., Disp: , Rfl:     venlafaxine XR (EFFEXOR-XR) 75 MG 24 hr capsule, Take 1 capsule by mouth Daily., Disp: , Rfl:     Xarelto 20 MG tablet, Take 1 tablet by mouth Every Evening., Disp: , Rfl:     zinc sulfate (ZINCATE) 220 (50 Zn) MG capsule, Take 1 capsule by mouth Daily., Disp: , Rfl:     Past Medical History:   Diagnosis Date    A-fib     Hyperlipemia     Hypertension     Prostate cancer        Past Surgical History:   Procedure Laterality Date    CARDIAC ABLATION      CARDIAC SURGERY      PROSTATE SURGERY      Removal       Social History     Socioeconomic History    Marital status:    Tobacco Use    Smoking status: Never     Passive exposure: Past    Smokeless tobacco: Never   Vaping Use    Vaping status: Never  "Used   Substance and Sexual Activity    Alcohol use: Never    Drug use: Never    Sexual activity: Defer       History reviewed. No pertinent family history.    Objective    Temp 97.8 °F (36.6 °C)   Ht 170.2 cm (67.01\")   Wt 119 kg (263 lb)   BMI 41.18 kg/m²     Physical Exam  Genitourinary:                Results for orders placed or performed in visit on 04/17/25   POC Urinalysis Dipstick, Multipro    Collection Time: 04/17/25 10:21 AM    Specimen: Urine   Result Value Ref Range    Color Yellow Yellow, Straw, Dark Yellow, Kimberly    Clarity, UA Clear Clear    Glucose, UA Negative Negative mg/dL    Bilirubin Negative Negative    Ketones, UA Negative Negative    Specific Gravity  1.030 1.005 - 1.030    Blood, UA Negative Negative    pH, Urine 6.0 5.0 - 8.0    Protein, POC Trace (A) Negative mg/dL    Urobilinogen, UA 0.2 E.U./dL Normal, 0.2 E.U./dL    Nitrite, UA Negative Negative    Leukocytes Negative Negative     Assessment and Plan    Diagnoses and all orders for this visit:    1. History of kidney stones (Primary)  -     POC Urinalysis Dipstick, Multipro    2. Balanitis  -     POC Urinalysis Dipstick, Multipro      Since the use of Lotrisone cream area has actually improved along the penile shaft there is still mild erythema noted however this is likely due to the buried penis and continued moisture however there is no open areas noted the excoriation appears to have resolved.  Recommend patient use Lotrisone cream as needed at this point    Scheduled follow-up due to history of kidney stones          "